# Patient Record
Sex: FEMALE | Race: AMERICAN INDIAN OR ALASKA NATIVE
[De-identification: names, ages, dates, MRNs, and addresses within clinical notes are randomized per-mention and may not be internally consistent; named-entity substitution may affect disease eponyms.]

---

## 2022-05-19 ENCOUNTER — HOSPITAL ENCOUNTER (EMERGENCY)
Dept: HOSPITAL 5 - ED | Age: 37
LOS: 1 days | Discharge: HOME | End: 2022-05-20
Payer: COMMERCIAL

## 2022-05-19 DIAGNOSIS — Z79.899: ICD-10-CM

## 2022-05-19 DIAGNOSIS — N39.0: Primary | ICD-10-CM

## 2022-05-19 PROCEDURE — 82962 GLUCOSE BLOOD TEST: CPT

## 2022-05-19 PROCEDURE — 85027 COMPLETE CBC AUTOMATED: CPT

## 2022-05-19 PROCEDURE — 99284 EMERGENCY DEPT VISIT MOD MDM: CPT

## 2022-05-19 PROCEDURE — 81025 URINE PREGNANCY TEST: CPT

## 2022-05-19 PROCEDURE — 81001 URINALYSIS AUTO W/SCOPE: CPT

## 2022-05-19 PROCEDURE — 96375 TX/PRO/DX INJ NEW DRUG ADDON: CPT

## 2022-05-19 PROCEDURE — 83690 ASSAY OF LIPASE: CPT

## 2022-05-19 PROCEDURE — 96374 THER/PROPH/DIAG INJ IV PUSH: CPT

## 2022-05-19 PROCEDURE — 74177 CT ABD & PELVIS W/CONTRAST: CPT

## 2022-05-19 PROCEDURE — 80053 COMPREHEN METABOLIC PANEL: CPT

## 2022-05-19 PROCEDURE — 36415 COLL VENOUS BLD VENIPUNCTURE: CPT

## 2022-05-20 VITALS — DIASTOLIC BLOOD PRESSURE: 82 MMHG | SYSTOLIC BLOOD PRESSURE: 131 MMHG

## 2022-05-20 LAB
ALBUMIN SERPL-MCNC: 3.9 G/DL (ref 3.9–5)
ALT SERPL-CCNC: 18 UNITS/L (ref 7–56)
BILIRUB UR QL STRIP: (no result)
BLOOD UR QL VISUAL: (no result)
BUN SERPL-MCNC: 16 MG/DL (ref 7–17)
BUN/CREAT SERPL: 18 %
CALCIUM SERPL-MCNC: 9.4 MG/DL (ref 8.4–10.2)
HCT VFR BLD CALC: 40.5 % (ref 30.3–42.9)
HEMOLYSIS INDEX: 6
HGB BLD-MCNC: 13.2 GM/DL (ref 10.1–14.3)
MCHC RBC AUTO-ENTMCNC: 33 % (ref 30–34)
MCV RBC AUTO: 79 FL (ref 79–97)
MUCOUS THREADS #/AREA URNS HPF: (no result) /HPF
PH UR STRIP: 6 [PH] (ref 5–7)
PLATELET # BLD: 229 K/MM3 (ref 140–440)
PROT UR STRIP-MCNC: (no result) MG/DL
RBC # BLD AUTO: 5.1 M/MM3 (ref 3.65–5.03)
RBC #/AREA URNS HPF: 3 /HPF (ref 0–6)
UROBILINOGEN UR-MCNC: 2 MG/DL (ref ?–2)
WBC #/AREA URNS HPF: 2 /HPF (ref 0–6)

## 2022-05-20 NOTE — EMERGENCY DEPARTMENT REPORT
ED Female  HPI





- General


Chief complaint: Nausea/Vomiting/Diarrhea


Stated complaint: WEAKNESS/NAUSEA


Source: patient


Mode of arrival: Ambulatory


Limitations: No Limitations





- History of Present Illness


Initial comments: 


36-year-old female presents to the ED complaining urinary frequency, fatigue and

nausea x2 days.  Patient states that she noticed an increase in urination x1 d

ay.  Patient denies any abdominal pain at present time.  She states that she 

became nausea this morning this a.m..  Denies any vomiting at present time.  

Patient denies any shortness of breath ,chest pain,or abd pain .  Patient is 

alert and orient x3.  No acute distress noted.  No ill appearance noted.





-: This morning





- Related Data


                                  Previous Rx's











 Medication  Instructions  Recorded  Last Taken  Type


 


Cyclobenzaprine [Flexeril] 10 mg PO TID PRN #15 tablet 16 Unknown Rx


 


Ibuprofen [Motrin] 800 mg PO Q8HR PRN #15 tablet 16 Unknown Rx


 


Hyoscyamine Subl [Levsin Sl 0.125 0.125 mg SL Q4HR PRN 5 Days #20 22 Unk

nown Rx





TAB] tablet   


 


Ondansetron (Nf) [Zofran TAB] 8 mg PO Q8HR PRN 5 Days #20 tablet 22 

Unknown Rx


 


cephALEXin [Keflex] 500 mg PO Q12HR 10 Days #20 cap 22 Unknown Rx











                                    Allergies











Allergy/AdvReac Type Severity Reaction Status Date / Time


 


No Known Allergies Allergy   Unverified 16 08:59














ED Review of Systems


ROS: 


Stated complaint: WEAKNESS/NAUSEA


Other details as noted in HPI





Constitutional: denies: chills, fever


Eyes: denies: eye pain, eye discharge, vision change


ENT: denies: ear pain, throat pain


Respiratory: denies: cough, shortness of breath, wheezing


Cardiovascular: denies: chest pain, palpitations


Endocrine: no symptoms reported


Gastrointestinal: denies: abdominal pain, nausea, diarrhea


Genitourinary: urgency, dysuria, frequency.  denies: discharge


Musculoskeletal: denies: back pain, joint swelling, arthralgia


Skin: denies: rash, lesions


Neurological: denies: headache, weakness, paresthesias


Psychiatric: denies: anxiety, depression


Hematological/Lymphatic: denies: easy bleeding, easy bruising





ED Past Medical Hx





- Surgical History


Additional Surgical History: 





- Social History


Smoking Status: Never Smoker


Substance Use Type: None





- Medications


Home Medications: 


                                Home Medications











 Medication  Instructions  Recorded  Confirmed  Last Taken  Type


 


Cyclobenzaprine [Flexeril] 10 mg PO TID PRN #15 tablet 16  Unknown Rx


 


Ibuprofen [Motrin] 800 mg PO Q8HR PRN #15 tablet 16  Unknown Rx


 


Hyoscyamine Subl [Levsin Sl 0.125 0.125 mg SL Q4HR PRN 5 Days #20 22  

Unknown Rx





TAB] tablet    


 


Ondansetron (Nf) [Zofran TAB] 8 mg PO Q8HR PRN 5 Days #20 tablet 22  

Unknown Rx


 


cephALEXin [Keflex] 500 mg PO Q12HR 10 Days #20 cap 22  Unknown Rx














ED Physical Exam





- General


Limitations: No Limitations


General appearance: alert, in no apparent distress





- Head


Head exam: Present: atraumatic, normocephalic





- Eye


Eye exam: Present: normal appearance





- ENT


ENT exam: Present: mucous membranes moist





- Neck


Neck exam: Present: normal inspection





- Respiratory


Respiratory exam: Present: normal lung sounds bilaterally.  Absent: respiratory 

distress





- Cardiovascular


Cardiovascular Exam: Present: regular rate, normal rhythm.  Absent: systolic 

murmur, diastolic murmur, rubs, gallop





- GI/Abdominal


GI/Abdominal exam: Present: soft, tenderness, normal bowel sounds





- Extremities Exam


Extremities exam: Present: normal inspection





- Back Exam


Back exam: Present: normal inspection





- Neurological Exam


Neurological exam: Present: alert, oriented X3





- Psychiatric


Psychiatric exam: Present: normal affect, normal mood





- Skin


Skin exam: Present: warm, dry, intact, normal color.  Absent: rash





ED Course


                                   Vital Signs











  22





  22:25 11:11


 


Temperature 98.2 F 98.0 F


 


Pulse Rate 97 H 79


 


Respiratory 16 20





Rate  


 


Blood Pressure 119/66 


 


Blood Pressure  131/82





[Right]  


 


O2 Sat by Pulse 98 99





Oximetry  














ED Medical Decision Making





- Lab Data


Result diagrams: 


                                 22 08:57





                                 22 08:57





- Medical Decision Making


36-year-old female presents to the ED complaining urinary frequency, fatigue and

 nausea x2 days.  Patient states that she noticed an increase in urination x1 

day.  Patient denies any abdominal pain at present time.  She states that she 

became nausea this morning this a.m..  Denies any vomiting at present time.  

Patient denies any shortness of breath ,chest pain,or abd pain .  Patient is 

alert and orient x3.  No acute distress noted.  No ill appearance noted.  

Physical examination patient has tenderness noted to the right upper quadrant.  

She states pain is a 4 out of 10. Normal  Saline 1 L given with Zofran 4mg IV . 

 She  states that he is feeling better.  CT is unremarkable.








Rechecked the patient is resting quietly quietly and comfortable and feeling 

better. I discussed the results of diagnostic study, my clinical impression and 

the plan for further treatment with the patient. Patient agrees with plan and 

discharge at this present time. All question addressed.





I have given the patient instruction regarding a diagnosis ,expectation ,follow-

up and return precaution. I explained to the patient that emergent condition may

 arise and to return to the ED for new worsen and any new persisting condition. 

I have explained the importance of following up with the primary care physician 

or referral physician listed below has instructed. The patient verbalized 

understanding of discharge instruction.








                              Abnormal Lab Results











  22





  08:57 08:57 Unknown


 


WBC   18.6 H 


 


RBC   5.10 H 


 


Hgb   13.2 


 


Hct   40.5 


 


MCV   79 


 


MCH   26 L 


 


MCHC   33 


 


RDW   15.7 H 


 


Plt Count   229 


 


Sodium  134 L  


 


Potassium  4.4  


 


Chloride  96.3 L  


 


Carbon Dioxide  28  


 


Anion Gap  14  


 


BUN  16  


 


Creatinine  0.9  


 


Estimated GFR  > 60  


 


BUN/Creatinine Ratio  18  


 


Glucose  164 H  


 


Calcium  9.4  


 


Total Bilirubin  0.70  


 


AST  13  


 


ALT  18  


 


Alkaline Phosphatase  124  


 


Total Protein  8.0  


 


Albumin  3.9  


 


Albumin/Globulin Ratio  1.0  


 


Lipase  16  


 


Urine Color    Yellow


 


Urine Turbidity    Clear


 


Urine pH    6.0


 


Ur Specific Gravity    1.020


 


Urine Protein    <15 mg/dl


 


Urine Glucose (UA)    Neg


 


Urine Ketones    Neg


 


Urine Blood    Sm


 


Urine Nitrite    Neg


 


Urine Bilirubin    Neg


 


Urine Urobilinogen    2.0


 


Ur Leukocyte Esterase    Neg


 


Urine WBC (Auto)    2.0


 


Urine RBC (Auto)    3.0


 


U Epithel Cells (Auto)    7.0


 


Urine Mucus    Few


 


Urine HCG, Qual    Negative














Critical care attestation.: 


If time is entered above; I have spent that time in minutes in the direct care 

of this critically ill patient, excluding procedure time.








ED Disposition


Clinical Impression: 


 Acute urinary tract infection





Disposition:  HOME / SELF CARE / HOMELESS


Is pt being admited?: No


Does the pt Need Aspirin: No


Condition: Stable


Instructions:  Antibiotic Medicine, Adult, Easy-to-Read, Urinary Tract 

Infection, Adult, Easy-to-Read


Additional Instructions: 


Take medication as prescribed


Return to the ED for any worsening symptoms


Prescriptions: 


cephALEXin [Keflex] 500 mg PO Q12HR 10 Days #20 cap


Hyoscyamine Subl [Levsin Sl 0.125 TAB] 0.125 mg SL Q4HR PRN 5 Days #20 tablet


 PRN Reason: Spasms


Ondansetron (Nf) [Zofran TAB] 8 mg PO Q8HR PRN 5 Days #20 tablet


 PRN Reason: bladder spasm


Referrals: 


PRIMARY CARE,MD [Primary Care Provider] - 3-5 Days


[facility] [Provider Group] - 3-5 Days


Forms:  Work/School Release Form(ED)


Time of Disposition: 10:55

## 2022-05-20 NOTE — CAT SCAN REPORT
CT ABDOMEN AND PELVIS WITH CONTRAST



HISTORY: right upper quad pain



COMPARISON: None



TECHNIQUE: Routine abdominal and pelvic CT exam performed  following intravenous contrast administrat
ion.. All CT scans at this location are performed using CT dose reduction for ALARA by means of autom
ated exposure control.



FINDINGS:



CT ABDOMEN:

Lung Bases: No significant abnormality.

Liver: No significant abnormality.

Biliary: No significant abnormality.

Spleen: No significant abnormality.  Unenlarged.

Pancreas: No significant abnormality.

Adrenals: No significant abnormality.

Kidneys: No significant abnormality.

Lymphatics: No lymphadenopathy.

Vasculature: No significant abnormality. 

Bowel/Peritoneum: No significant abnormality. No free air. No free fluid.



CT PELVIC:

: Enlarged uterus containing heterogeneous masses, likely fibroids.

Lymphatics: No lymphadenopathy.



Osseous Structures: No aggressive appearing osseous lesions.

Additional Findings: None



IMPRESSION:

1. No acute findings.

2. Enlarged uterus with multiple masses, likely uterine fibroids.



Signer Name: Sagar Mayo MD 

Signed: 5/20/2022 10:13 AM

Workstation Name: [id number]